# Patient Record
Sex: FEMALE | Race: WHITE | NOT HISPANIC OR LATINO | Employment: UNEMPLOYED | URBAN - METROPOLITAN AREA
[De-identification: names, ages, dates, MRNs, and addresses within clinical notes are randomized per-mention and may not be internally consistent; named-entity substitution may affect disease eponyms.]

---

## 2022-01-03 ENCOUNTER — LAB (OUTPATIENT)
Dept: LAB | Facility: HOSPITAL | Age: 27
End: 2022-01-03

## 2022-01-03 LAB
ABO GROUP BLD: NORMAL
BASOPHILS # BLD AUTO: 0.02 10*3/MM3 (ref 0–0.2)
BASOPHILS NFR BLD AUTO: 0.2 % (ref 0–1.5)
BLD GP AB SCN SERPL QL: NEGATIVE
DEPRECATED RDW RBC AUTO: 40.2 FL (ref 37–54)
EOSINOPHIL # BLD AUTO: 0.05 10*3/MM3 (ref 0–0.4)
EOSINOPHIL NFR BLD AUTO: 0.6 % (ref 0.3–6.2)
ERYTHROCYTE [DISTWIDTH] IN BLOOD BY AUTOMATED COUNT: 11.6 % (ref 12.3–15.4)
HCT VFR BLD AUTO: 44.6 % (ref 34–46.6)
HGB BLD-MCNC: 14.8 G/DL (ref 12–15.9)
IMM GRANULOCYTES # BLD AUTO: 0.02 10*3/MM3 (ref 0–0.05)
IMM GRANULOCYTES NFR BLD AUTO: 0.2 % (ref 0–0.5)
LYMPHOCYTES # BLD AUTO: 2.37 10*3/MM3 (ref 0.7–3.1)
LYMPHOCYTES NFR BLD AUTO: 29.4 % (ref 19.6–45.3)
MCH RBC QN AUTO: 31.8 PG (ref 26.6–33)
MCHC RBC AUTO-ENTMCNC: 33.2 G/DL (ref 31.5–35.7)
MCV RBC AUTO: 95.9 FL (ref 79–97)
MONOCYTES # BLD AUTO: 0.53 10*3/MM3 (ref 0.1–0.9)
MONOCYTES NFR BLD AUTO: 6.6 % (ref 5–12)
NEUTROPHILS NFR BLD AUTO: 5.07 10*3/MM3 (ref 1.7–7)
NEUTROPHILS NFR BLD AUTO: 63 % (ref 42.7–76)
NRBC BLD AUTO-RTO: 0 /100 WBC (ref 0–0.2)
PLATELET # BLD AUTO: 293 10*3/MM3 (ref 140–450)
PMV BLD AUTO: 10.9 FL (ref 6–12)
RBC # BLD AUTO: 4.65 10*6/MM3 (ref 3.77–5.28)
RH BLD: NEGATIVE
SARS-COV-2 RNA PNL SPEC NAA+PROBE: NOT DETECTED
T&S EXPIRATION DATE: NORMAL
WBC NRBC COR # BLD: 8.06 10*3/MM3 (ref 3.4–10.8)

## 2022-01-03 PROCEDURE — 85025 COMPLETE CBC W/AUTO DIFF WBC: CPT | Performed by: OBSTETRICS & GYNECOLOGY

## 2022-01-03 PROCEDURE — 86900 BLOOD TYPING SEROLOGIC ABO: CPT | Performed by: OBSTETRICS & GYNECOLOGY

## 2022-01-03 PROCEDURE — 86901 BLOOD TYPING SEROLOGIC RH(D): CPT | Performed by: OBSTETRICS & GYNECOLOGY

## 2022-01-03 PROCEDURE — 86901 BLOOD TYPING SEROLOGIC RH(D): CPT

## 2022-01-03 PROCEDURE — C9803 HOPD COVID-19 SPEC COLLECT: HCPCS

## 2022-01-03 PROCEDURE — 86850 RBC ANTIBODY SCREEN: CPT | Performed by: OBSTETRICS & GYNECOLOGY

## 2022-01-03 PROCEDURE — 86900 BLOOD TYPING SEROLOGIC ABO: CPT

## 2022-01-03 PROCEDURE — 36415 COLL VENOUS BLD VENIPUNCTURE: CPT | Performed by: OBSTETRICS & GYNECOLOGY

## 2022-01-03 PROCEDURE — 87635 SARS-COV-2 COVID-19 AMP PRB: CPT

## 2022-01-04 ENCOUNTER — ANESTHESIA EVENT (OUTPATIENT)
Dept: PERIOP | Facility: HOSPITAL | Age: 27
End: 2022-01-04

## 2022-01-04 ENCOUNTER — HOSPITAL ENCOUNTER (OUTPATIENT)
Facility: HOSPITAL | Age: 27
Setting detail: HOSPITAL OUTPATIENT SURGERY
Discharge: HOME | End: 2022-01-04
Attending: OBSTETRICS & GYNECOLOGY | Admitting: OBSTETRICS & GYNECOLOGY

## 2022-01-04 ENCOUNTER — ANESTHESIA (OUTPATIENT)
Dept: PERIOP | Facility: HOSPITAL | Age: 27
End: 2022-01-04

## 2022-01-04 VITALS
SYSTOLIC BLOOD PRESSURE: 118 MMHG | TEMPERATURE: 97.5 F | WEIGHT: 121.2 LBS | HEIGHT: 61 IN | DIASTOLIC BLOOD PRESSURE: 62 MMHG | HEART RATE: 58 BPM | RESPIRATION RATE: 12 BRPM | OXYGEN SATURATION: 98 % | BODY MASS INDEX: 22.88 KG/M2

## 2022-01-04 DIAGNOSIS — Z98.890 S/P D&C (STATUS POST DILATION AND CURETTAGE): Primary | ICD-10-CM

## 2022-01-04 DIAGNOSIS — O03.9 MISCARRIAGE: ICD-10-CM

## 2022-01-04 LAB — NUMBER OF DOSES: NORMAL

## 2022-01-04 PROCEDURE — 25010000002 ONDANSETRON PER 1 MG

## 2022-01-04 PROCEDURE — 25010000002 MIDAZOLAM PER 1 MG

## 2022-01-04 PROCEDURE — 25010000002 METHYLERGONOVINE MALEATE PER 0.2 MG

## 2022-01-04 PROCEDURE — 25010000002 RHO D IMMUNE GLOBULIN 1500 UNIT/2ML SOLUTION PREFILLED SYRINGE: Performed by: OBSTETRICS & GYNECOLOGY

## 2022-01-04 PROCEDURE — 25010000002 PROPOFOL 10 MG/ML EMULSION

## 2022-01-04 PROCEDURE — 25010000002 KETOROLAC TROMETHAMINE PER 15 MG

## 2022-01-04 PROCEDURE — 88305 TISSUE EXAM BY PATHOLOGIST: CPT | Performed by: OBSTETRICS & GYNECOLOGY

## 2022-01-04 PROCEDURE — 25010000002 FENTANYL CITRATE (PF) 100 MCG/2ML SOLUTION

## 2022-01-04 PROCEDURE — 25010000002 LORAZEPAM PER 2 MG

## 2022-01-04 PROCEDURE — 25010000002 DEXAMETHASONE PER 1 MG

## 2022-01-04 RX ORDER — LIDOCAINE HYDROCHLORIDE 20 MG/ML
INJECTION, SOLUTION EPIDURAL; INFILTRATION; INTRACAUDAL; PERINEURAL AS NEEDED
Status: DISCONTINUED | OUTPATIENT
Start: 2022-01-04 | End: 2022-01-04 | Stop reason: SURG

## 2022-01-04 RX ORDER — ACETAMINOPHEN 325 MG/1
650 TABLET ORAL ONCE AS NEEDED
Status: DISCONTINUED | OUTPATIENT
Start: 2022-01-04 | End: 2022-01-04 | Stop reason: HOSPADM

## 2022-01-04 RX ORDER — LORAZEPAM 2 MG/ML
1 INJECTION INTRAMUSCULAR
Status: DISCONTINUED | OUTPATIENT
Start: 2022-01-04 | End: 2022-01-04 | Stop reason: HOSPADM

## 2022-01-04 RX ORDER — ACETAMINOPHEN 650 MG/1
650 SUPPOSITORY RECTAL ONCE AS NEEDED
Status: DISCONTINUED | OUTPATIENT
Start: 2022-01-04 | End: 2022-01-04 | Stop reason: HOSPADM

## 2022-01-04 RX ORDER — FENTANYL CITRATE 50 UG/ML
50 INJECTION, SOLUTION INTRAMUSCULAR; INTRAVENOUS
Status: DISCONTINUED | OUTPATIENT
Start: 2022-01-04 | End: 2022-01-04 | Stop reason: HOSPADM

## 2022-01-04 RX ORDER — PROPOFOL 10 MG/ML
VIAL (ML) INTRAVENOUS AS NEEDED
Status: DISCONTINUED | OUTPATIENT
Start: 2022-01-04 | End: 2022-01-04 | Stop reason: SURG

## 2022-01-04 RX ORDER — METHYLERGONOVINE MALEATE 0.2 MG/ML
INJECTION INTRAVENOUS AS NEEDED
Status: DISCONTINUED | OUTPATIENT
Start: 2022-01-04 | End: 2022-01-04 | Stop reason: SURG

## 2022-01-04 RX ORDER — FENTANYL CITRATE 50 UG/ML
INJECTION, SOLUTION INTRAMUSCULAR; INTRAVENOUS AS NEEDED
Status: DISCONTINUED | OUTPATIENT
Start: 2022-01-04 | End: 2022-01-04 | Stop reason: SURG

## 2022-01-04 RX ORDER — KETOROLAC TROMETHAMINE 15 MG/ML
INJECTION, SOLUTION INTRAMUSCULAR; INTRAVENOUS AS NEEDED
Status: DISCONTINUED | OUTPATIENT
Start: 2022-01-04 | End: 2022-01-04 | Stop reason: SURG

## 2022-01-04 RX ORDER — SODIUM CHLORIDE, SODIUM LACTATE, POTASSIUM CHLORIDE, CALCIUM CHLORIDE 600; 310; 30; 20 MG/100ML; MG/100ML; MG/100ML; MG/100ML
1000 INJECTION, SOLUTION INTRAVENOUS CONTINUOUS
Status: DISCONTINUED | OUTPATIENT
Start: 2022-01-04 | End: 2022-01-04 | Stop reason: HOSPADM

## 2022-01-04 RX ORDER — ONDANSETRON 2 MG/ML
4 INJECTION INTRAMUSCULAR; INTRAVENOUS ONCE AS NEEDED
Status: DISCONTINUED | OUTPATIENT
Start: 2022-01-04 | End: 2022-01-04 | Stop reason: HOSPADM

## 2022-01-04 RX ORDER — OXYCODONE HYDROCHLORIDE 5 MG/1
10 TABLET ORAL EVERY 4 HOURS PRN
Status: DISCONTINUED | OUTPATIENT
Start: 2022-01-04 | End: 2022-01-04 | Stop reason: HOSPADM

## 2022-01-04 RX ORDER — HYDROMORPHONE HCL 110MG/55ML
0.5 PATIENT CONTROLLED ANALGESIA SYRINGE INTRAVENOUS
Status: DISCONTINUED | OUTPATIENT
Start: 2022-01-04 | End: 2022-01-04 | Stop reason: HOSPADM

## 2022-01-04 RX ORDER — HYDROMORPHONE HCL 110MG/55ML
1 PATIENT CONTROLLED ANALGESIA SYRINGE INTRAVENOUS
Status: DISCONTINUED | OUTPATIENT
Start: 2022-01-04 | End: 2022-01-04 | Stop reason: HOSPADM

## 2022-01-04 RX ORDER — LIDOCAINE HYDROCHLORIDE 10 MG/ML
0.5 INJECTION, SOLUTION INFILTRATION; PERINEURAL ONCE AS NEEDED
Status: DISCONTINUED | OUTPATIENT
Start: 2022-01-04 | End: 2022-01-04 | Stop reason: HOSPADM

## 2022-01-04 RX ORDER — MIDAZOLAM HYDROCHLORIDE 1 MG/ML
INJECTION INTRAMUSCULAR; INTRAVENOUS AS NEEDED
Status: DISCONTINUED | OUTPATIENT
Start: 2022-01-04 | End: 2022-01-04 | Stop reason: SURG

## 2022-01-04 RX ORDER — SODIUM CHLORIDE 0.9 % (FLUSH) 0.9 %
10 SYRINGE (ML) INJECTION AS NEEDED
Status: DISCONTINUED | OUTPATIENT
Start: 2022-01-04 | End: 2022-01-04 | Stop reason: HOSPADM

## 2022-01-04 RX ORDER — ALBUTEROL SULFATE 2.5 MG/3ML
2.5 SOLUTION RESPIRATORY (INHALATION) ONCE AS NEEDED
Status: DISCONTINUED | OUTPATIENT
Start: 2022-01-04 | End: 2022-01-04 | Stop reason: HOSPADM

## 2022-01-04 RX ORDER — OXYCODONE HYDROCHLORIDE AND ACETAMINOPHEN 5; 325 MG/1; MG/1
1 TABLET ORAL EVERY 6 HOURS PRN
Qty: 10 TABLET | Refills: 0 | Status: SHIPPED | OUTPATIENT
Start: 2022-01-04

## 2022-01-04 RX ORDER — NALOXONE HCL 0.4 MG/ML
0.4 VIAL (ML) INJECTION AS NEEDED
Status: DISCONTINUED | OUTPATIENT
Start: 2022-01-04 | End: 2022-01-04 | Stop reason: HOSPADM

## 2022-01-04 RX ORDER — HYDROMORPHONE HCL 110MG/55ML
0.25 PATIENT CONTROLLED ANALGESIA SYRINGE INTRAVENOUS
Status: DISCONTINUED | OUTPATIENT
Start: 2022-01-04 | End: 2022-01-04 | Stop reason: HOSPADM

## 2022-01-04 RX ORDER — OXYCODONE HYDROCHLORIDE 5 MG/1
7.5 TABLET ORAL ONCE AS NEEDED
Status: DISCONTINUED | OUTPATIENT
Start: 2022-01-04 | End: 2022-01-04 | Stop reason: HOSPADM

## 2022-01-04 RX ORDER — ONDANSETRON 2 MG/ML
INJECTION INTRAMUSCULAR; INTRAVENOUS AS NEEDED
Status: DISCONTINUED | OUTPATIENT
Start: 2022-01-04 | End: 2022-01-04 | Stop reason: SURG

## 2022-01-04 RX ORDER — DEXAMETHASONE SODIUM PHOSPHATE 4 MG/ML
INJECTION, SOLUTION INTRA-ARTICULAR; INTRALESIONAL; INTRAMUSCULAR; INTRAVENOUS; SOFT TISSUE AS NEEDED
Status: DISCONTINUED | OUTPATIENT
Start: 2022-01-04 | End: 2022-01-04 | Stop reason: SURG

## 2022-01-04 RX ORDER — MEPERIDINE HYDROCHLORIDE 25 MG/ML
12.5 INJECTION INTRAMUSCULAR; INTRAVENOUS; SUBCUTANEOUS
Status: DISCONTINUED | OUTPATIENT
Start: 2022-01-04 | End: 2022-01-04 | Stop reason: HOSPADM

## 2022-01-04 RX ORDER — FENTANYL CITRATE 50 UG/ML
25 INJECTION, SOLUTION INTRAMUSCULAR; INTRAVENOUS
Status: DISCONTINUED | OUTPATIENT
Start: 2022-01-04 | End: 2022-01-04 | Stop reason: HOSPADM

## 2022-01-04 RX ADMIN — MIDAZOLAM 2 MG: 1 INJECTION INTRAMUSCULAR; INTRAVENOUS at 13:45

## 2022-01-04 RX ADMIN — LORAZEPAM 1 MG: 2 INJECTION INTRAMUSCULAR; INTRAVENOUS at 14:30

## 2022-01-04 RX ADMIN — SODIUM CHLORIDE, POTASSIUM CHLORIDE, SODIUM LACTATE AND CALCIUM CHLORIDE 1000 ML: 600; 310; 30; 20 INJECTION, SOLUTION INTRAVENOUS at 13:10

## 2022-01-04 RX ADMIN — FENTANYL CITRATE 50 MCG: 0.05 INJECTION, SOLUTION INTRAMUSCULAR; INTRAVENOUS at 13:35

## 2022-01-04 RX ADMIN — KETOROLAC TROMETHAMINE 15 MG: 15 INJECTION, SOLUTION INTRAMUSCULAR; INTRAVENOUS at 13:59

## 2022-01-04 RX ADMIN — LIDOCAINE HYDROCHLORIDE 60 MG: 20 INJECTION, SOLUTION EPIDURAL; INFILTRATION; INTRACAUDAL; PERINEURAL at 13:36

## 2022-01-04 RX ADMIN — MIDAZOLAM 2 MG: 1 INJECTION INTRAMUSCULAR; INTRAVENOUS at 13:30

## 2022-01-04 RX ADMIN — ONDANSETRON 4 MG: 2 INJECTION INTRAMUSCULAR; INTRAVENOUS at 14:01

## 2022-01-04 RX ADMIN — METHYLERGONOVINE MALEATE 200 MCG: 0.2 INJECTION, SOLUTION INTRAMUSCULAR; INTRAVENOUS at 13:54

## 2022-01-04 RX ADMIN — DEXAMETHASONE SODIUM PHOSPHATE 4 MG: 4 INJECTION, SOLUTION INTRAMUSCULAR; INTRAVENOUS at 13:43

## 2022-01-04 RX ADMIN — FENTANYL CITRATE 50 MCG: 0.05 INJECTION, SOLUTION INTRAMUSCULAR; INTRAVENOUS at 13:57

## 2022-01-04 RX ADMIN — PROPOFOL 150 MG: 10 INJECTION, EMULSION INTRAVENOUS at 13:39

## 2022-01-04 RX ADMIN — LORAZEPAM 1 MG: 2 INJECTION INTRAMUSCULAR; INTRAVENOUS at 14:32

## 2022-01-04 RX ADMIN — HUMAN RHO(D) IMMUNE GLOBULIN 1500 UNITS: 1500 SOLUTION INTRAMUSCULAR; INTRAVENOUS at 15:56

## 2022-01-04 NOTE — ANESTHESIA POSTPROCEDURE EVALUATION
Patient: Madhuri Wise    Procedure Summary     Date: 01/04/22 Room / Location: [facility] OR [address] [place] [address] MAIN OR    Anesthesia Start: 1330 Anesthesia Stop: 1408    Procedure: DILATATION AND CURETTAGE WITH SUCTION (N/A Vagina) Diagnosis:       Miscarriage      (Miscarriage [O03.9])    Surgeons: Amber Rodriguez MD Provider: Varghese Li MD    Anesthesia Type: general ASA Status: 2          Anesthesia Type: general    Vitals  Vitals Value Taken Time   BP 90/44 01/04/22 1410   Temp 97.5 °F (36.4 °C) 01/04/22 1404   Pulse 67 01/04/22 1411   Resp 9 01/04/22 1404   SpO2 99 % 01/04/22 1411   Vitals shown include unvalidated device data.        Post Anesthesia Care and Evaluation    Patient location during evaluation: PACU  Patient participation: complete - patient participated  Level of consciousness: awake  Pain scale: See nurse's notes for pain score.  Pain management: adequate  Airway patency: patent  Anesthetic complications: No anesthetic complications  PONV Status: none  Cardiovascular status: acceptable  Respiratory status: acceptable  Hydration status: acceptable    Comments: Patient seen and examined postoperatively; vital signs stable; SpO2 greater than or equal to 90%; cardiopulmonary status stable; nausea/vomiting adequately controlled; pain adequately controlled; no apparent anesthesia complications; patient discharged from anesthesia care when discharge criteria were met

## 2022-01-04 NOTE — OP NOTE
Subjective     Date of Service:  22    Pre-operative diagnosis(es): 27 yo  @ 6 wks c missed      Post-operative diagnosis(es): same   Procedure(s): Suction D&C         Surgeon:    Dr. Amber Rodriguez   EBL: 75cc         Anesthesia:  General Anesthesia       Objective      Operative findings: 6 wk sized uterus     Description of Procedure:   The risks, benefits, and alternatives of the procedure were discussed with the patient. She understood the risks of the procedure include but are not limited to uterine damage or perforation, fluid overload, and rare risk of death. She wished to proceed and signed the consent.    The patient was taken to the OR where general anesthesia was administered. She was placed in the dorsal lithotomy position with All stirrups. The patient was then prepared and draped in the normal sterile fashion.    A weighted speculum was inserted in the posterior aspect of the vagina. A single-tooth tenaculum was used to grasp the anterior lip of the cervix. The uterus was carefully sounded to 9 cm.     The cervical os was sequentially dilated to accommodate the 8 Fr suction curette using Eladia dilators.     The products of conception were removed with a suction curette and then was curetted in a clockwise fashion with the banjo curette until the products of conception were removed from all aspects of uterus. The products of conception were sent to Pathology. The tenaculum was removed from the cervix and good hemostasis was noted.    The patient tolerated the procedure well. The instrument and sponge counts were correct times two. The patient was awakened from general anesthesia and taken to the recovery room in a stable condition.           Specimens removed:   Products of conception         Condition:   stable     Disposition:   to PACU               Amber Rodriguez MD  22  13:22 EST

## 2022-01-04 NOTE — OP NOTE
Subjective     Date of Service:  22    Pre-operative diagnosis(es): 25 yo  @ 6 wks c missed      Post-operative diagnosis(es): same   Procedure(s): Suction D&C         Surgeon:    Dr. Amber Rodriguez   EBL: 75cc         Anesthesia:  General Anesthesia       Objective      Operative findings: 6 wk sized uterus     Description of Procedure:   The risks, benefits, and alternatives of the procedure were discussed with the patient. She understood the risks of the procedure include but are not limited to uterine damage or perforation, fluid overload, and rare risk of death. She wished to proceed and signed the consent.    The patient was taken to the OR where general anesthesia was administered. She was placed in the dorsal lithotomy position with All stirrups. The patient was then prepared and draped in the normal sterile fashion.    A weighted speculum was inserted in the posterior aspect of the vagina. A single-tooth tenaculum was used to grasp the anterior lip of the cervix. The uterus was carefully sounded to 9 cm.     The cervical os was sequentially dilated to accommodate the 8 Fr suction curette using Eladia dilators.     The products of conception were removed with a suction curette and then was curetted in a clockwise fashion with the banjo curette until the products of conception were removed from all aspects of uterus. The products of conception were sent to Pathology. The tenaculum was removed from the cervix and good hemostasis was noted.    The patient tolerated the procedure well. The instrument and sponge counts were correct times two. The patient was awakened from general anesthesia and taken to the recovery room in a stable condition.           Specimens removed:   Products of conception         Condition:   stable     Disposition:   to PACU               Amber Rodriguez MD  22  13:59 EST

## 2022-01-04 NOTE — ANESTHESIA PROCEDURE NOTES
Airway  Urgency: elective    Date/Time: 1/4/2022 1:40 PM  Airway not difficult    General Information and Staff    Patient location during procedure: OR  Anesthesiologist: Varghese Li MD  CRNA: Aleena Anderson CAA    Indications and Patient Condition  Indications for airway management: airway protection    Preoxygenated: yes  Mask difficulty assessment: 0 - not attempted    Final Airway Details  Final airway type: supraglottic airway      Successful airway: unique  Size 4    Number of attempts at approach: 1  Assessment: lips, teeth, and gum same as pre-op and atraumatic intubation    Additional Comments  LMA placement by JEWELL Pinon

## 2022-01-06 LAB
LAB AP CASE REPORT: NORMAL
PATH REPORT.FINAL DX SPEC: NORMAL
PATH REPORT.GROSS SPEC: NORMAL

## 2023-08-14 ENCOUNTER — APPOINTMENT (OUTPATIENT)
Dept: ULTRASOUND IMAGING | Facility: HOSPITAL | Age: 28
End: 2023-08-14

## 2023-08-14 ENCOUNTER — HOSPITAL ENCOUNTER (EMERGENCY)
Facility: HOSPITAL | Age: 28
Discharge: HOME | End: 2023-08-14
Attending: EMERGENCY MEDICINE | Admitting: EMERGENCY MEDICINE

## 2023-08-14 VITALS
RESPIRATION RATE: 18 BRPM | WEIGHT: 115.74 LBS | OXYGEN SATURATION: 100 % | TEMPERATURE: 98.3 F | HEIGHT: 61 IN | DIASTOLIC BLOOD PRESSURE: 75 MMHG | HEART RATE: 88 BPM | SYSTOLIC BLOOD PRESSURE: 109 MMHG | BODY MASS INDEX: 21.85 KG/M2

## 2023-08-14 DIAGNOSIS — O20.0 THREATENED MISCARRIAGE IN EARLY PREGNANCY: Primary | ICD-10-CM

## 2023-08-14 LAB
ABO GROUP BLD: NORMAL
ABO GROUP BLD: NORMAL
BASOPHILS # BLD AUTO: 0.06 10*3/MM3 (ref 0–0.2)
BASOPHILS NFR BLD AUTO: 0.4 % (ref 0–1.5)
BLD GP AB SCN SERPL QL: NEGATIVE
DEPRECATED RDW RBC AUTO: 42.1 FL (ref 37–54)
EOSINOPHIL # BLD AUTO: 0.45 10*3/MM3 (ref 0–0.4)
EOSINOPHIL NFR BLD AUTO: 2.7 % (ref 0.3–6.2)
ERYTHROCYTE [DISTWIDTH] IN BLOOD BY AUTOMATED COUNT: 12.1 % (ref 12.3–15.4)
HCG INTACT+B SERPL-ACNC: 230.6 MIU/ML
HCT VFR BLD AUTO: 42.3 % (ref 34–46.6)
HGB BLD-MCNC: 13.9 G/DL (ref 12–15.9)
IMM GRANULOCYTES # BLD AUTO: 0.06 10*3/MM3 (ref 0–0.05)
IMM GRANULOCYTES NFR BLD AUTO: 0.4 % (ref 0–0.5)
LYMPHOCYTES # BLD AUTO: 2.23 10*3/MM3 (ref 0.7–3.1)
LYMPHOCYTES NFR BLD AUTO: 13.3 % (ref 19.6–45.3)
MCH RBC QN AUTO: 30.7 PG (ref 26.6–33)
MCHC RBC AUTO-ENTMCNC: 32.9 G/DL (ref 31.5–35.7)
MCV RBC AUTO: 93.4 FL (ref 79–97)
MONOCYTES # BLD AUTO: 0.91 10*3/MM3 (ref 0.1–0.9)
MONOCYTES NFR BLD AUTO: 5.4 % (ref 5–12)
NEUTROPHILS NFR BLD AUTO: 13.06 10*3/MM3 (ref 1.7–7)
NEUTROPHILS NFR BLD AUTO: 77.8 % (ref 42.7–76)
NRBC BLD AUTO-RTO: 0 /100 WBC (ref 0–0.2)
NUMBER OF DOSES: ABNORMAL
PLATELET # BLD AUTO: 317 10*3/MM3 (ref 140–450)
PMV BLD AUTO: 10.4 FL (ref 6–12)
RBC # BLD AUTO: 4.53 10*6/MM3 (ref 3.77–5.28)
RH BLD: NEGATIVE
RH BLD: NEGATIVE
WBC NRBC COR # BLD: 16.77 10*3/MM3 (ref 3.4–10.8)

## 2023-08-14 PROCEDURE — 86901 BLOOD TYPING SEROLOGIC RH(D): CPT | Performed by: NURSE PRACTITIONER

## 2023-08-14 PROCEDURE — 86900 BLOOD TYPING SEROLOGIC ABO: CPT

## 2023-08-14 PROCEDURE — 88305 TISSUE EXAM BY PATHOLOGIST: CPT | Performed by: NURSE PRACTITIONER

## 2023-08-14 PROCEDURE — 86901 BLOOD TYPING SEROLOGIC RH(D): CPT

## 2023-08-14 PROCEDURE — 36415 COLL VENOUS BLD VENIPUNCTURE: CPT

## 2023-08-14 PROCEDURE — 86850 RBC ANTIBODY SCREEN: CPT | Performed by: NURSE PRACTITIONER

## 2023-08-14 PROCEDURE — 96372 THER/PROPH/DIAG INJ SC/IM: CPT

## 2023-08-14 PROCEDURE — 85025 COMPLETE CBC W/AUTO DIFF WBC: CPT

## 2023-08-14 PROCEDURE — 84702 CHORIONIC GONADOTROPIN TEST: CPT

## 2023-08-14 PROCEDURE — 99284 EMERGENCY DEPT VISIT MOD MDM: CPT

## 2023-08-14 PROCEDURE — 86900 BLOOD TYPING SEROLOGIC ABO: CPT | Performed by: NURSE PRACTITIONER

## 2023-08-14 PROCEDURE — 25010000002 RHO D IMMUNE GLOBULIN 1500 UNIT/2ML SOLUTION PREFILLED SYRINGE: Performed by: NURSE PRACTITIONER

## 2023-08-14 PROCEDURE — 76817 TRANSVAGINAL US OBSTETRIC: CPT

## 2023-08-14 RX ADMIN — HUMAN RHO(D) IMMUNE GLOBULIN 300 MCG: 1500 SOLUTION INTRAMUSCULAR; INTRAVENOUS at 21:52

## 2023-08-14 NOTE — ED PROVIDER NOTES
Time: 6:02 PM EDT  Date of encounter:  8/14/2023  Independent Historian/Clinical History and Information was obtained by:   Patient    History is limited by: N/A    Chief Complaint   Patient presents with    Vaginal Bleeding         History of Present Illness:  Patient is a 28 y.o. year old female who presents to the emergency department for evaluation of vaginal bleeding.  Patient states that she has been spotting for a few days but today began having gushes of blood and passing clots.  Patient states she had miscarriage back in May and has not had any abnormal bleeding since then.  Patient states she had a positive pregnancy test at home.  (Provider in triage, Genaro Worthy PA-C)    HPI    Patient Care Team  Primary Care Provider: Provider, Dorothy Known    Past Medical History:     No Known Allergies  Past Medical History:   Diagnosis Date    Anxiety and depression     GERD (gastroesophageal reflux disease)     IBS (irritable bowel syndrome)      Past Surgical History:   Procedure Laterality Date    D & C WITH SUCTION N/A 1/4/2022    Procedure: DILATATION AND CURETTAGE WITH SUCTION;  Surgeon: Amber Rodriguez MD;  [facility] OR;  Service: Gynecology;  Laterality: N/A;    DIAGNOSTIC LAPAROSCOPY      HYSTEROSALPINGOGRAM      HYSTEROSCOPY      LASER ABLATION/CAUTERIZATION OF ENDOMETRIAL IMPLANTS      SALPINGO OOPHORECTOMY      unilateral    TONSILLECTOMY      WISDOM TOOTH EXTRACTION       History reviewed. No pertinent family history.    Home Medications:  Prior to Admission medications    Medication Sig Start Date End Date Taking? Authorizing Provider   oxyCODONE-acetaminophen (Percocet) 5-325 MG per tablet Take 1 tablet by mouth Every 6 (Six) Hours As Needed for Moderate Pain . 1/4/22   Amber Rodriguez MD   Prenatal Vit-Fe Fumarate-FA (BL PRENATAL VITAMINS PO) Take  by mouth.    ProviderAurora MD        Social History:   Social History     Tobacco Use    Smoking status: Former    Smokeless  "tobacco: Former     Types: Chew     Quit date: 2017   Vaping Use    Vaping Use: Never used   Substance Use Topics    Alcohol use: Never    Drug use: Not Currently     Types: Marijuana     Comment: 3 months ago         Review of Systems:  Review of Systems   Constitutional:  Negative for chills and fever.   HENT:  Negative for congestion, ear pain and sore throat.    Eyes:  Negative for pain.   Respiratory:  Negative for cough, chest tightness and shortness of breath.    Cardiovascular:  Negative for chest pain and palpitations.   Gastrointestinal:  Negative for abdominal pain, diarrhea, nausea and vomiting.   Genitourinary:  Positive for vaginal bleeding. Negative for flank pain and hematuria.   Musculoskeletal:  Negative for joint swelling.   Skin:  Negative for pallor.   Neurological:  Negative for dizziness, seizures and headaches.   Hematological: Negative.    Psychiatric/Behavioral: Negative.     All other systems reviewed and are negative.     Physical Exam:  /75 (BP Location: Left arm, Patient Position: Sitting)   Pulse 88   Temp 98.3 øF (36.8 øC) (Oral)   Resp 18   Ht 154.9 cm (61\")   Wt 52.5 kg (115 lb 11.9 oz)   LMP 05/17/2023 Comment: she bled in may due to a miscarriage and no other bleeding since then, unsure how far into this pregnancy she was  SpO2 100%   BMI 21.87 kg/mý         Physical Exam  Vitals and nursing note reviewed.   Constitutional:       General: She is not in acute distress.     Appearance: Normal appearance. She is not toxic-appearing.   HENT:      Head: Normocephalic and atraumatic.      Nose: Nose normal.      Mouth/Throat:      Mouth: Mucous membranes are moist.   Eyes:      General: No scleral icterus.     Conjunctiva/sclera: Conjunctivae normal.   Cardiovascular:      Rate and Rhythm: Normal rate and regular rhythm.      Heart sounds: Normal heart sounds.   Pulmonary:      Effort: Pulmonary effort is normal. No respiratory distress.      Breath sounds: Normal breath " sounds.   Abdominal:      General: Abdomen is flat. Bowel sounds are normal. There is no distension.      Palpations: Abdomen is soft.      Tenderness: There is no abdominal tenderness.   Genitourinary:     General: Normal vulva.      Comments: Mild active bleeding no clots.  Dark blood  Musculoskeletal:         General: Normal range of motion.      Cervical back: Normal range of motion and neck supple.   Skin:     General: Skin is warm and dry.   Neurological:      General: No focal deficit present.      Mental Status: She is alert and oriented to person, place, and time.   Psychiatric:         Mood and Affect: Mood normal.         Behavior: Behavior normal.              Procedures:  Procedures      Medical Decision Making:      Comorbidities that affect care:    History of miscarriage    External Notes reviewed:    Previous Admission Note: Patient seen in 2022 for a D&C after miscarriage as well      The following orders were placed and all results were independently analyzed by me:  Orders Placed This Encounter   Procedures    US Ob Transvaginal    hCG, Quantitative, Pregnancy    CBC Auto Differential    hCG, Quantitative, Pregnancy    Specimen To Pathology    ABO / Rh     RhIg Evaluation    Doses of Rh Immune Globulin    CBC & Differential       Medications Given in the Emergency Department:  Medications   Rho D Immune Globulin (RHOPHYLAC) injection 300 mcg (has no administration in time range)        ED Course:    The patient was initially evaluated in the triage area where orders were placed. The patient was later dispositioned by KEISHA Bradford.      The patient was advised to stay for completion of workup which includes but is not limited to communication of labs and radiological results, reassessment and plan. The patient was advised that leaving prior to disposition by a provider could result in critical findings that are not communicated to the patient.     ED Course as of 23  2121   Mon Aug 14, 2023   1803 PROVIDER IN TRIAGE  Patient was evaluated by me in triage, Genaro Worthy PA-C.  Orders were placed and patient is currently awaiting final results and disposition.  [MD]   2119 Patient had passed some products of conception prior to ultrasound.  Those have been sent for pathology [DS]      ED Course User Index  [DS] Fabienne Wells APRN  [MD] Genaro Worthy PA-C       Labs:    Lab Results (last 24 hours)       Procedure Component Value Units Date/Time    CBC & Differential [[id number]]  (Abnormal) Collected: 08/14/23 1816    Specimen: Blood Updated: 08/14/23 1910    Narrative:      The following orders were created for panel order CBC & Differential.  Procedure                               Abnormality         Status                     ---------                               -----------         ------                     CBC Auto Differential[[id number]]        Abnormal            Final result                 Please view results for these tests on the individual orders.    hCG, Quantitative, Pregnancy [[id number]] Collected: 08/14/23 1816    Specimen: Blood Updated: 08/14/23 1933     HCG Quantitative 230.60 mIU/mL     Narrative:      HCG Ranges by Gestational Age    Females - non-pregnant premenopausal   </= 1mIU/mL HCG  Females - postmenopausal               </= 7mIU/mL HCG    3 Weeks       5.4   -      72 mIU/mL  4 Weeks      10.2   -     708 mIU/mL  5 Weeks       217   -   8,245 mIU/mL  6 Weeks       152   -  32,177 mIU/mL  7 Weeks     4,059   - 153,767 mIU/mL  8 Weeks    31,366   - 149,094 mIU/mL  9 Weeks    59,109   - 135,901 mIU/mL  10 Weeks   44,186   - 170,409 mIU/mL  12 Weeks   27,107   - 201,615 mIU/mL  14 Weeks   24,302   -  93,646 mIU/mL  15 Weeks   12,540   -  69,747 mIU/mL  16 Weeks    8,904   -  55,332 mIU/mL  17 Weeks    8,240   -  51,793 mIU/mL  18 Weeks    9,649   -  55,271 mIU/mL      CBC Auto Differential [[id number]]  (Abnormal) Collected: 08/14/23 1816     Specimen: Blood Updated: 08/14/23 1910     WBC 16.77 10*3/mm3      RBC 4.53 10*6/mm3      Hemoglobin 13.9 g/dL      Hematocrit 42.3 %      MCV 93.4 fL      MCH 30.7 pg      MCHC 32.9 g/dL      RDW 12.1 %      RDW-SD 42.1 fl      MPV 10.4 fL      Platelets 317 10*3/mm3      Neutrophil % 77.8 %      Lymphocyte % 13.3 %      Monocyte % 5.4 %      Eosinophil % 2.7 %      Basophil % 0.4 %      Immature Grans % 0.4 %      Neutrophils, Absolute 13.06 10*3/mm3      Lymphocytes, Absolute 2.23 10*3/mm3      Monocytes, Absolute 0.91 10*3/mm3      Eosinophils, Absolute 0.45 10*3/mm3      Basophils, Absolute 0.06 10*3/mm3      Immature Grans, Absolute 0.06 10*3/mm3      nRBC 0.0 /100 WBC              Imaging:    US Ob Transvaginal    Result Date: 8/14/2023  PROCEDURE: US OB TRANSVAGINAL  COMPARISON: None  INDICATIONS: Vaginal bleeding positive pregnancy test.  Unknown gestation  TECHNIQUE: Ultrasound examination of the pelvis was performed, using endovaginal technique.   FINDINGS:  The uterus measures 7 cm in length.  The uterus measures 5.7 cm x 4.7 cm in greatest transverse and AP dimension.  Heterogeneously echoic material is seen in the uterus.  The endometrial bi layer measures 1.5 cm.  This is contiguous with heterogeneously echoic material in the endocervical canal which measures 3.1 cm x 2 cm.  A gestational sac is not identified.  The right ovary measures 3 cm in greatest dimension, with a volume of 4.6 cc.  The patient is post left oophorectomy.  No free fluid is seen.         Transvaginal pelvic ultrasound demonstrating heterogeneously echoic material in the uterus.     ALBERT COONEY MD       Electronically Signed and Approved By: ALBERT COONEY MD on 8/14/2023 at 20:19                Differential Diagnosis and Discussion:      Vaginal Bleeding: Differential diagnosis includes but is not limited to foreign body, tumor, vaginitis, dysfunctional uterine bleeding, endocrine abnormalities, coagulation disorder, systemic  illness, polyps, complications of pregnancy (possible ectopic pregnancy).    All labs were reviewed and interpreted by me.  Ultrasound impression was interpreted by me.     MDM  Number of Diagnoses or Management Options  Threatened miscarriage in early pregnancy  Diagnosis management comments: The patient presents with vaginal bleeding. Possible etiology of vaginal bleeding were considered, but not limited to; ectopic pregnancy, spontaneous miscarriage, gestational trophoblastic disease, implantation bleeding, molar pregnancy, disfunctional uterine bleeding, and fibroids. The patient is well appearing and resting comfortably. There are no indications for transfusion. After careful consideration the patient is safe and stable to be discharged home with an outpatient OB/GYN follow-up. Patient was counseled to return to the ER or follow-up with their OB/GYN in 48 hours especially for worsening of her bleeding or increased pain. The patient has expressed a clear and thorough understanding and his agreed to follow-up as instructed.         Amount and/or Complexity of Data Reviewed  Clinical lab tests: reviewed and ordered  Tests in the radiology section of CPTr: reviewed and ordered  Tests in the medicine section of CP[facility]: ordered and reviewed    Risk of Complications, Morbidity, and/or Mortality  Presenting problems: moderate  Diagnostic procedures: low  Management options: low    Patient Progress  Patient progress: stable       Patient Care Considerations:    CONSULT: I considered consulting OB/GYN, however patient is not having active hemorrhage.  Vital signs are stable.  No need for emergent evaluation or hospitalization patient can follow-up outpatient      Consultants/Shared Management Plan:    None    Social Determinants of Health:    Patient is independent, reliable, and has access to care.       Disposition and Care Coordination:    Discharged: I considered escalation of care by admitting this patient for  observation, however the patient has improved and is suitable and  stable for discharge.    I have explained the patient's condition, diagnoses and treatment plan based on the information available to me at this time. I have answered questions and addressed any concerns. The patient has a good  understanding of the patient's diagnosis, condition, and treatment plan as can be expected at this point. The vital signs have been stable. The patient's condition is stable and appropriate for discharge from the emergency department.      The patient will pursue further outpatient evaluation with the primary care physician or other designated or consulting physician as outlined in the discharge instructions. They are agreeable to this plan of care and follow-up instructions have been explained in detail. The patient has received these instructions in written format and have expressed an understanding of the discharge instructions. The patient is aware that any significant change in condition or worsening of symptoms should prompt an immediate return to this or the closest emergency department or call to 911.  I have explained discharge medications and the need for follow up with the patient/caretakers. This was also printed in the discharge instructions. Patient was discharged with the following medications and follow up:      Medication List      No changes were made to your prescriptions during this visit.      Maggie Allen MD  [address] [facility]  [id number]  [place] [address]  [phone]      Follow-up of repeat hCG and suspected miscarriage.  Possible products of conception sent to pathology       Final diagnoses:   Threatened miscarriage in early pregnancy        ED Disposition       ED Disposition   Discharge    Condition   Stable    Comment   --               This medical record created using voice recognition software.             Fabienne Wells, KEISHA  08/14/23 [id number]

## 2023-08-15 NOTE — DISCHARGE INSTRUCTIONS
Get repeat hCG in 2 days.    What is suspected to be products of conception have been sent to pathology and results will be available after exam is performed on [facility].  You may follow-up with OB/GYN to get these results as well.    Call OB/GYN as above or 1 of choice tomorrow to make next available appointment after the 16th when you get your repeat hCG.    Tylenol for pain.    Return to emergency department for bleeding greater than 1 pad per hour

## 2023-08-16 LAB
CYTO UR: NORMAL
LAB AP CASE REPORT: NORMAL
LAB AP CLINICAL INFORMATION: NORMAL
PATH REPORT.FINAL DX SPEC: NORMAL
PATH REPORT.GROSS SPEC: NORMAL

## 2024-04-15 ENCOUNTER — TRANSCRIBE ORDERS (OUTPATIENT)
Dept: ADMINISTRATIVE | Facility: HOSPITAL | Age: 29
End: 2024-04-15
Payer: COMMERCIAL

## 2024-04-15 DIAGNOSIS — O20.9 BLEEDING IN EARLY PREGNANCY: Primary | ICD-10-CM

## 2024-04-16 ENCOUNTER — HOSPITAL ENCOUNTER (EMERGENCY)
Facility: HOSPITAL | Age: 29
Discharge: HOME | End: 2024-04-16
Attending: EMERGENCY MEDICINE
Payer: COMMERCIAL

## 2024-04-16 ENCOUNTER — APPOINTMENT (OUTPATIENT)
Dept: ULTRASOUND IMAGING | Facility: HOSPITAL | Age: 29
End: 2024-04-16
Payer: COMMERCIAL

## 2024-04-16 VITALS
HEART RATE: 85 BPM | HEIGHT: 61 IN | RESPIRATION RATE: 18 BRPM | DIASTOLIC BLOOD PRESSURE: 83 MMHG | BODY MASS INDEX: 22.09 KG/M2 | OXYGEN SATURATION: 100 % | TEMPERATURE: 98.6 F | WEIGHT: 117 LBS | SYSTOLIC BLOOD PRESSURE: 137 MMHG

## 2024-04-16 DIAGNOSIS — Z34.90 INTRAUTERINE PREGNANCY: Primary | ICD-10-CM

## 2024-04-16 DIAGNOSIS — Z34.91 FIRST TRIMESTER PREGNANCY: ICD-10-CM

## 2024-04-16 DIAGNOSIS — O20.8 SUBCHORIONIC HEMORRHAGE IN FIRST TRIMESTER: ICD-10-CM

## 2024-04-16 LAB
ABO GROUP BLD: NORMAL
BASOPHILS # BLD AUTO: 0.02 10*3/MM3 (ref 0–0.2)
BASOPHILS NFR BLD AUTO: 0.2 % (ref 0–1.5)
BLD GP AB SCN SERPL QL: NEGATIVE
DEPRECATED RDW RBC AUTO: 44.8 FL (ref 37–54)
EOSINOPHIL # BLD AUTO: 0.03 10*3/MM3 (ref 0–0.4)
EOSINOPHIL NFR BLD AUTO: 0.2 % (ref 0.3–6.2)
ERYTHROCYTE [DISTWIDTH] IN BLOOD BY AUTOMATED COUNT: 13.4 % (ref 12.3–15.4)
HCG INTACT+B SERPL-ACNC: NORMAL MIU/ML
HCT VFR BLD AUTO: 36.4 % (ref 34–46.6)
HGB BLD-MCNC: 12.2 G/DL (ref 12–15.9)
IMM GRANULOCYTES # BLD AUTO: 0.03 10*3/MM3 (ref 0–0.05)
IMM GRANULOCYTES NFR BLD AUTO: 0.2 % (ref 0–0.5)
LYMPHOCYTES # BLD AUTO: 1.66 10*3/MM3 (ref 0.7–3.1)
LYMPHOCYTES NFR BLD AUTO: 13.2 % (ref 19.6–45.3)
MCH RBC QN AUTO: 30.4 PG (ref 26.6–33)
MCHC RBC AUTO-ENTMCNC: 33.5 G/DL (ref 31.5–35.7)
MCV RBC AUTO: 90.8 FL (ref 79–97)
MONOCYTES # BLD AUTO: 0.57 10*3/MM3 (ref 0.1–0.9)
MONOCYTES NFR BLD AUTO: 4.5 % (ref 5–12)
NEUTROPHILS NFR BLD AUTO: 10.24 10*3/MM3 (ref 1.7–7)
NEUTROPHILS NFR BLD AUTO: 81.7 % (ref 42.7–76)
NRBC BLD AUTO-RTO: 0 /100 WBC (ref 0–0.2)
NUMBER OF DOSES: ABNORMAL
PLATELET # BLD AUTO: 254 10*3/MM3 (ref 140–450)
PMV BLD AUTO: 10.2 FL (ref 6–12)
RBC # BLD AUTO: 4.01 10*6/MM3 (ref 3.77–5.28)
RH BLD: NEGATIVE
WBC NRBC COR # BLD AUTO: 12.55 10*3/MM3 (ref 3.4–10.8)

## 2024-04-16 PROCEDURE — 85025 COMPLETE CBC W/AUTO DIFF WBC: CPT | Performed by: EMERGENCY MEDICINE

## 2024-04-16 PROCEDURE — 86901 BLOOD TYPING SEROLOGIC RH(D): CPT | Performed by: EMERGENCY MEDICINE

## 2024-04-16 PROCEDURE — 36415 COLL VENOUS BLD VENIPUNCTURE: CPT

## 2024-04-16 PROCEDURE — 84702 CHORIONIC GONADOTROPIN TEST: CPT | Performed by: EMERGENCY MEDICINE

## 2024-04-16 PROCEDURE — 76817 TRANSVAGINAL US OBSTETRIC: CPT

## 2024-04-16 PROCEDURE — 86850 RBC ANTIBODY SCREEN: CPT | Performed by: EMERGENCY MEDICINE

## 2024-04-16 PROCEDURE — 86900 BLOOD TYPING SEROLOGIC ABO: CPT | Performed by: EMERGENCY MEDICINE

## 2024-04-16 PROCEDURE — 99284 EMERGENCY DEPT VISIT MOD MDM: CPT

## 2024-04-16 PROCEDURE — 25010000002 RHO D IMMUNE GLOBULIN 1500 UNIT/2ML SOLUTION PREFILLED SYRINGE: Performed by: EMERGENCY MEDICINE

## 2024-04-16 PROCEDURE — 96372 THER/PROPH/DIAG INJ SC/IM: CPT

## 2024-04-16 RX ADMIN — HUMAN RHO(D) IMMUNE GLOBULIN 1500 UNITS: 1500 SOLUTION INTRAMUSCULAR; INTRAVENOUS at 17:46
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
## 2024-04-16 NOTE — DISCHARGE INSTRUCTIONS
Drink plenty of fluids.  Pelvic rest (nothing in your vagina, i.e. intercourse, tampons).  Return for worsening symptoms.  Follow-up with your doctor and/or OB/GYN this week.

## 2024-04-25 ENCOUNTER — PATIENT ROUNDING (BHMG ONLY) (OUTPATIENT)
Dept: OBSTETRICS AND GYNECOLOGY | Facility: CLINIC | Age: 29
End: 2024-04-25
Payer: COMMERCIAL

## 2024-04-25 ENCOUNTER — OFFICE VISIT (OUTPATIENT)
Dept: OBSTETRICS AND GYNECOLOGY | Facility: CLINIC | Age: 29
End: 2024-04-25
Payer: COMMERCIAL

## 2024-04-25 VITALS — BODY MASS INDEX: 19.84 KG/M2 | WEIGHT: 105 LBS | DIASTOLIC BLOOD PRESSURE: 77 MMHG | SYSTOLIC BLOOD PRESSURE: 121 MMHG

## 2024-04-25 DIAGNOSIS — O20.0 THREATENED ABORTION: Primary | ICD-10-CM

## 2024-04-25 PROBLEM — O20.9 BLEEDING IN EARLY PREGNANCY: Status: ACTIVE | Noted: 2024-04-25

## 2024-04-25 NOTE — ASSESSMENT & PLAN NOTE
Consult with Dr. Yee about patient's symptoms, +CM on brief TAS.  No formal US needed at this time.

## 2024-04-25 NOTE — PROGRESS NOTES
Chief Complaint   Patient presents with    Possible Pregnancy     Patient follow up from ER had u/s done patient started bleeding last night and passed some tissue.           HPI  Madhuri Wise is a 28 y.o. female, , who presents for follow up on a Threatened AB.   She was evaluated in ED on 24 for vaginal bleeding/spotting, US showed a viable IUP at 8w5d with a subchorionic hemorrhage.  She did receive RhoGAM at that visit.  The spotting has continued off and on until last evening when she had a large gush with what looked like some tissue, did have some moderate cramping.  Bleeding has slowed to spotting this AM, mild cramping.  She reports a history of at least 10 miscarriages and one ectopic.        The additional following portions of the patient's history were reviewed and updated as appropriate: allergies, current medications, past family history, past medical history, past social history, past surgical history, and problem list.    Review of Systems   Constitutional: Negative.    Genitourinary:         Early pregnancy bleeding         I have reviewed and agree with the HPI, ROS, and historical information as entered above. Marek Damian, APRN    Objective   /77   Wt 47.6 kg (105 lb)   LMP 2023 Comment: she bled in may due to a miscarriage and no other bleeding since then, unsure how far into this pregnancy she was  Breastfeeding No   BMI 19.84 kg/m²     Physical Exam  Vitals and nursing note reviewed. Exam conducted with a chaperone present.   Constitutional:       Appearance: Normal appearance. She is well-developed and well-groomed.   HENT:      Head: Normocephalic.   Eyes:      Pupils: Pupils are equal, round, and reactive to light.   Abdominal:      General: Abdomen is flat.      Comments: Brief TAS demonstrates an IUP with cardiac activity, fetal movement noted.    Genitourinary:     General: Normal vulva.      Exam position: Lithotomy position.      Pubic Area: No rash or  pubic lice.       Labia:         Right: No rash, tenderness, lesion or injury.         Left: No rash, tenderness, lesion or injury.       Vagina: Normal. No foreign body. No vaginal discharge, erythema, tenderness, bleeding or lesions.      Cervix: No cervical motion tenderness or discharge.      Comments: Scant dark blood noted at OS  Skin:     General: Skin is warm and dry.   Neurological:      General: No focal deficit present.      Mental Status: She is alert.            Assessment and Plan    Problem List Items Addressed This Visit          Gravid and     Threatened  - Primary    Current Assessment & Plan     Consult with Dr. Yee about patient's symptoms, +CM on brief TAS.  No formal US needed at this time.          Relevant Orders    NuSwab VG+ - Swab, Cervix       Threatened AB  Pelvic Rest.  No douching, intercourse or use of tampons.  Call for an increase in bleeding, abdominal pain, or fever.  Report to ED if saturating a pad greater than every 30-60 mins.  Return for as needed.        Marek Damian, APRN  2024

## 2024-04-27 LAB
A VAGINAE DNA VAG QL NAA+PROBE: NORMAL SCORE
BVAB2 DNA VAG QL NAA+PROBE: NORMAL SCORE
C ALBICANS DNA VAG QL NAA+PROBE: NEGATIVE
C GLABRATA DNA VAG QL NAA+PROBE: NEGATIVE
C TRACH DNA VAG QL NAA+PROBE: NEGATIVE
MEGA1 DNA VAG QL NAA+PROBE: NORMAL SCORE
N GONORRHOEA DNA VAG QL NAA+PROBE: NEGATIVE
T VAGINALIS DNA VAG QL NAA+PROBE: NEGATIVE

## 2024-05-02 ENCOUNTER — TELEPHONE (OUTPATIENT)
Dept: OBSTETRICS AND GYNECOLOGY | Facility: CLINIC | Age: 29
End: 2024-05-02

## 2024-05-02 NOTE — TELEPHONE ENCOUNTER
Caller: Madhuri Wise    Relationship: Self    Best call back number: [phone]    What form or medical record are you requesting: OBGYN MED RECS    Who is requesting this form or medical record from you: [facility]    How would you like to receive the form or medical records (pick-up, mail, fax): FAX  If fax, what is the fax number: [phone]  Timeframe paperwork needed: ASAP    Additional notes: PATIENT HAS RECENTLY MOVED TO [place] AND IS NEEDING HER OBGYN MED RECS ASAP SO THAT SHE CAN SCHEDULE AN APPT TO BE SEEN BY A NEW PROVIDER.         
No Vaccines Administered.

## (undated) DEVICE — SOLUTION,WATER,IRRIGATION,1000ML,STERILE: Brand: MEDLINE

## (undated) DEVICE — TRAP TISS

## (undated) DEVICE — WET SKIN PREP TRAY: Brand: MEDLINE INDUSTRIES, INC.

## (undated) DEVICE — TBG W FLTR FOR BERKELEY SYSTEM

## (undated) DEVICE — KT SURG TURNOVER 050

## (undated) DEVICE — PK MINOR LITHOTOMY 50

## (undated) DEVICE — CANN VAC GYN VACURETTE BERKELEY CRV 8MM 1P/U STRL

## (undated) DEVICE — SLV SCD CALF HEMOFORCE DVT THERP REPROC MD

## (undated) DEVICE — GLV SURG SIGNATURE ESSENTIAL PF LTX SZ7

## (undated) DEVICE — SYS FLUID MGMT HYST SAFETOUCH

## (undated) DEVICE — ST COL BERKELY TBG

## (undated) DEVICE — HOSE BT TO BT VAC CURETTAGE SNGL PT USE